# Patient Record
(demographics unavailable — no encounter records)

---

## 2025-03-21 NOTE — HISTORY OF PRESENT ILLNESS
[FreeTextEntry8] : NELSON OVERTON is a 69 year old male who presents for new patient focused exam. PMHx: Hypertension, Active Smoker (50+) Is from [facility] and travels there often.  His PCP is no longer under his plan, transferring care, needs med renewals.

## 2025-03-21 NOTE — REVIEW OF SYSTEMS
[Shortness Of Breath] : no shortness of breath [Wheezing] : wheezing [Cough] : cough [Dyspnea on Exertion] : dyspnea on exertion [Negative] : Psychiatric

## 2025-03-21 NOTE — PHYSICAL EXAM
[No JVD] : no jugular venous distention [No Lymphadenopathy] : no lymphadenopathy [Normal] : normal rate, regular rhythm, normal S1 and S2 and no murmur heard [No Edema] : there was no peripheral edema [Normal Gait] : normal gait [Normal Affect] : the affect was normal [Normal Insight/Judgement] : insight and judgment were intact [de-identified] : mid lung field expiratory wheezing b/l, good air movement

## 2025-03-21 NOTE — PLAN
[FreeTextEntry1] : #Hypertension: on Amlodipine 5 mg daily, Atenolol 50 mg daily--renewed #Bronchitis, active smoker: likely has chronic bronchitis--strongly advised on smoking cessation, [facility]. Start Advair 115-21 2 puffs BID.  labs drawn  F/u 2m for CPE

## 2025-07-02 NOTE — HISTORY OF PRESENT ILLNESS
[FreeTextEntry1] : annual physical [de-identified] : NELSON OVERTON is a 69 year old male who presents for annual comprehensive exam. PMHx: Hypertension, Active Smoker (50 pack year), Pulmonary Nodules/Emphysema (CT 5/2025)